# Patient Record
Sex: MALE | Race: WHITE
[De-identification: names, ages, dates, MRNs, and addresses within clinical notes are randomized per-mention and may not be internally consistent; named-entity substitution may affect disease eponyms.]

---

## 2017-08-11 ENCOUNTER — HOSPITAL ENCOUNTER (EMERGENCY)
Dept: HOSPITAL 25 - ED | Age: 52
Discharge: HOME | End: 2017-08-11
Payer: MEDICARE

## 2017-08-11 VITALS — DIASTOLIC BLOOD PRESSURE: 68 MMHG | SYSTOLIC BLOOD PRESSURE: 107 MMHG

## 2017-08-11 DIAGNOSIS — R07.9: Primary | ICD-10-CM

## 2017-08-11 DIAGNOSIS — F17.210: ICD-10-CM

## 2017-08-11 LAB
ALBUMIN SERPL BCG-MCNC: 4.3 G/DL (ref 3.2–5.2)
ALP SERPL-CCNC: 90 U/L (ref 34–104)
ALT SERPL W P-5'-P-CCNC: 37 U/L (ref 7–52)
ANION GAP SERPL CALC-SCNC: 4 MMOL/L (ref 2–11)
AST SERPL-CCNC: 29 U/L (ref 13–39)
BUN SERPL-MCNC: 8 MG/DL (ref 6–24)
BUN/CREAT SERPL: 9.1 (ref 8–20)
CALCIUM SERPL-MCNC: 9.4 MG/DL (ref 8.6–10.3)
CHLORIDE SERPL-SCNC: 104 MMOL/L (ref 101–111)
GLOBULIN SER CALC-MCNC: 2.8 G/DL (ref 2–4)
GLUCOSE SERPL-MCNC: 98 MG/DL (ref 70–100)
HCO3 SERPL-SCNC: 29 MMOL/L (ref 22–32)
HCT VFR BLD AUTO: 53 % (ref 42–52)
HGB BLD-MCNC: 17.9 G/DL (ref 14–18)
MCH RBC QN AUTO: 33 PG (ref 27–31)
MCHC RBC AUTO-ENTMCNC: 34 G/DL (ref 31–36)
MCV RBC AUTO: 97 FL (ref 80–94)
POTASSIUM SERPL-SCNC: 4.1 MMOL/L (ref 3.5–5)
PROT SERPL-MCNC: 7.1 G/DL (ref 6.4–8.9)
RBC # BLD AUTO: 5.47 10^6/UL (ref 4–5.4)
SODIUM SERPL-SCNC: 137 MMOL/L (ref 133–145)
TROPONIN I SERPL-MCNC: 0 NG/ML (ref ?–0.04)
WBC # BLD AUTO: 8.2 10^3/UL (ref 3.5–10.8)

## 2017-08-11 PROCEDURE — 80053 COMPREHEN METABOLIC PANEL: CPT

## 2017-08-11 PROCEDURE — 71010: CPT

## 2017-08-11 PROCEDURE — 99283 EMERGENCY DEPT VISIT LOW MDM: CPT

## 2017-08-11 PROCEDURE — 93005 ELECTROCARDIOGRAM TRACING: CPT

## 2017-08-11 PROCEDURE — 85379 FIBRIN DEGRADATION QUANT: CPT

## 2017-08-11 PROCEDURE — 36415 COLL VENOUS BLD VENIPUNCTURE: CPT

## 2017-08-11 PROCEDURE — 84484 ASSAY OF TROPONIN QUANT: CPT

## 2017-08-11 PROCEDURE — 83605 ASSAY OF LACTIC ACID: CPT

## 2017-08-11 PROCEDURE — 85025 COMPLETE CBC W/AUTO DIFF WBC: CPT

## 2017-08-11 NOTE — ED
Guillermo CLARKE Rebecca, scribed for Linsey Hutchinson MD on 08/11/17 at 1211 .





HPI Chest Pain





- HPI Summary


HPI Summary: 


Pt is a 50 y/o M who presents to ED c/o left anterior CP without radiation. 

Pain began a few weeks ago and has been constant since onset, waxing and waning 

in intensity. At its best, pain is 1/10 and at its worst, pain is 3-4/10. Pain 

is currently mild, ranked 3/10 and characterized as "heart pain." Pain is 

unchanged from wheat he has been experiencing since onset. Took 81 mg ASA PTA. 

Sx aggravated and alleviated by nothing, unchanged by deep breaths. Denies leg 

cramping. No recent travel. PMHx DVT, MI - current sx are not similar to pain 

experienced during MI. FHx MI < 54 y/o (mother). SHx heavy every day smoker. 








- History of Current Complaint


Chief Complaint: EDChestPainROMI


Hx Obtained From: Patient


Onset/Duration: Started Weeks Ago, Still Present


Timing: Constant


Initial Severity: Mild - 1/10


Current Severity: Mild


Pain Intensity: 3


Pain Scale Used: 0-10 Numeric


Chest Pain Location: Left Anterior


Chest Pain Radiates: No


Character: Other: - "heart pain"


Aggravating Factor(s): Nothing


Alleviating Factor(s): Nothing


Associated Signs and Symptoms: Positive: Negative





- Additional Pertinent History


Primary Care Physician: [id number]





- Allergy/Home Medications


Allergies/Adverse Reactions: 


 Allergies











Allergy/AdvReac Type Severity Reaction Status Date / Time


 


Penicillins Allergy Severe Hives Verified 08/11/17 12:03


 


Sulfa Antibiotics Allergy Severe Hives Verified 08/11/17 12:03











Home Medications: 


 Home Medications





Oxycodone HCl [Oxycontin] 15 mg PO QID PRN 08/11/17 [History Confirmed 08/11/17]


Pravastatin (NF) [Pravachol (NF)] 80 mg PO DAILY 08/11/17 [History Confirmed 08/ 11/17]











PMH/Surg Hx/FS Hx/Imm Hx


Endocrine/Hematology History: 


   Denies: Hx Diabetes


Cardiovascular History: Reports: Hx Angina, Hx Coronary Artery Disease - STENT, 

Hx Deep Vein Thrombosis - LEG 2006, Hx Hypercholesterolemia, Hx Hypertension, 

Hx Myocardial Infarction, Other Cardiovascular Problems/Disorders - BLOOD CLOT 

IN RIGHT LOWER LEG AFTER KNEE SURGERY


   Denies: Hx Congestive Heart Failure, Hx Pacemaker/ICD


GI History: Reports: Hx Gastroesophageal Reflux Disease - OCCASSIOANL -TAKES 2 

TUMS WITH RELIEF, Other GI Disorders - HEMMORHOIDS


Musculoskeletal History: Reports: Hx Arthritis, Other Musculoskeletal History - 

CHRONIC CERVICAL DISK DISEASE


Sensory History: 


   Denies: Hx Contacts or Glasses, Hx Hearing Aid


Opthamlomology History: 


   Denies: Hx Contacts or Glasses


Psychiatric History: 


   Denies: Hx Panic Disorder





- Surgical History


Surgery Procedure, Year, and Place: CARDIAC STENTS 2007 "MULTILINK VISION", 

CERVICAL LAMINECTOMY 2004 AND 2014, HEMMORRHOIDS, KNEE 2006


Hx Anesthesia Reactions: No


Infectious Disease History: No


Infectious Disease History: 


   Denies: Traveled Outside the [place] in Last 30 Days





- Family History


Known Family History: Positive: Cardiac Disease - Mother MI < 54 y/o, 

Hypertension





- Social History


Lives: With Family - Girlfriend


Alcohol Use: Occasionally


Substance Use Type: Reports: None


Hx Tobacco Use: Yes


Smoking Status (MU): Heavy Every Day Tobacco Smoker


Type: Cigarettes


Amount Used/How Often: PT STATES 1PPD, SO STATES 2PPD


Have You Smoked in the Last Year: Yes





Review of Systems


Positive: Chest Pain - Left anterior for a few weeks


Positive: Other - Negative leg cramping


All Other Systems Reviewed And Are Negative: Yes





Physical Exam





- Summary


Physical Exam Summary: 


General: Well appearing, mild discomfort


Skin: Warm, Skin Color Reflects Adequate Perfusion, Dry


Eyes: EOMI, COLE


ENT: Pharynx normal, TMs normal


Neck: Supple, nontender


Respiratory: CTA, breath sounds present, no rhonchi, no wheezes, no rales


Cardiovascular: RRR, no murmur, no rub, no gallop


Abdomen: Soft, nontender, Non-distended, no guarding, no rebound


Bowel: Present


Musculoskeletal: LUIS FERNANDO, No edema


Neuro: Sensory/motor intact, A&Ox3, CN intact 2-12


Psych: Affect/mood appropriate


Triage Information Reviewed: Yes


Vital Signs On Initial Exam: 


 Initial Vitals











Temp Pulse Resp BP Pulse Ox


 


 98.3 F   64   20   143/78   99 


 


 08/11/17 11:49  08/11/17 11:49  08/11/17 11:49  08/11/17 11:49  08/11/17 11:49











Vital Signs Reviewed: Yes





Diagnostics





- Vital Signs


 Vital Signs











  Temp Pulse Resp BP Pulse Ox


 


 08/11/17 11:59  99 F  67  16  129/81  96


 


 08/11/17 11:49  98.3 F  64  20  143/78  99














- Laboratory


Lab Results: 


 Lab Results











  08/11/17 08/11/17 08/11/17 Range/Units





  12:15 12:15 12:15 


 


WBC  8.2    (3.5-10.8)  10^3/ul


 


RBC  5.47 H    (4.0-5.4)  10^6/ul


 


Hgb  17.9    (14.0-18.0)  g/dl


 


Hct  53 H    (42-52)  %


 


MCV  97 H    (80-94)  fL


 


MCH  33 H    (27-31)  pg


 


MCHC  34    (31-36)  g/dl


 


RDW  15    (10.5-15)  %


 


Plt Count  205    (150-450)  10^3/ul


 


MPV  8    (7.4-10.4)  um3


 


Neut % (Auto)  73.5    (38-83)  %


 


Lymph % (Auto)  17.3 L    (25-47)  %


 


Mono % (Auto)  6.5    (1-9)  %


 


Eos % (Auto)  2.2    (0-6)  %


 


Baso % (Auto)  0.5    (0-2)  %


 


Absolute Neuts (auto)  6.0    (1.5-7.7)  10^3/ul


 


Absolute Lymphs (auto)  1.4    (1.0-4.8)  10^3/ul


 


Absolute Monos (auto)  0.5    (0-0.8)  10^3/ul


 


Absolute Eos (auto)  0.2    (0-0.6)  10^3/ul


 


Absolute Basos (auto)  0    (0-0.2)  10^3/ul


 


Absolute Nucleated RBC  0    10^3/ul


 


Nucleated RBC %  0    


 


D-Dimer, Quantitative   < 200   (Less Than 230)  ng/mL


 


Sodium    137  (133-145)  mmol/L


 


Potassium    4.1  (3.5-5.0)  mmol/L


 


Chloride    104  (101-111)  mmol/L


 


Carbon Dioxide    29  (22-32)  mmol/L


 


Anion Gap    4  (2-11)  mmol/L


 


BUN    8  (6-24)  mg/dL


 


Creatinine    0.88  (0.67-1.17)  mg/dL


 


Est GFR ( Amer)    116.5  (>60)  


 


Est GFR (Non-Af Amer)    90.6  (>60)  


 


BUN/Creatinine Ratio    9.1  (8-20)  


 


Glucose    98  ()  mg/dL


 


Lactic Acid     (0.5-2.0)  mmol/L


 


Calcium    9.4  (8.6-10.3)  mg/dL


 


Total Bilirubin    0.40  (0.2-1.0)  mg/dL


 


AST    29  (13-39)  U/L


 


ALT    37  (7-52)  U/L


 


Alkaline Phosphatase    90  ()  U/L


 


Troponin I    0.00  (<0.04)  ng/mL


 


Total Protein    7.1  (6.4-8.9)  g/dL


 


Albumin    4.3  (3.2-5.2)  g/dL


 


Globulin    2.8  (2-4)  g/dL


 


Albumin/Globulin Ratio    1.5  (1-3)  














  08/11/17 Range/Units





  12:15 


 


WBC   (3.5-10.8)  10^3/ul


 


RBC   (4.0-5.4)  10^6/ul


 


Hgb   (14.0-18.0)  g/dl


 


Hct   (42-52)  %


 


MCV   (80-94)  fL


 


MCH   (27-31)  pg


 


MCHC   (31-36)  g/dl


 


RDW   (10.5-15)  %


 


Plt Count   (150-450)  10^3/ul


 


MPV   (7.4-10.4)  um3


 


Neut % (Auto)   (38-83)  %


 


Lymph % (Auto)   (25-47)  %


 


Mono % (Auto)   (1-9)  %


 


Eos % (Auto)   (0-6)  %


 


Baso % (Auto)   (0-2)  %


 


Absolute Neuts (auto)   (1.5-7.7)  10^3/ul


 


Absolute Lymphs (auto)   (1.0-4.8)  10^3/ul


 


Absolute Monos (auto)   (0-0.8)  10^3/ul


 


Absolute Eos (auto)   (0-0.6)  10^3/ul


 


Absolute Basos (auto)   (0-0.2)  10^3/ul


 


Absolute Nucleated RBC   10^3/ul


 


Nucleated RBC %   


 


D-Dimer, Quantitative   (Less Than 230)  ng/mL


 


Sodium   (133-145)  mmol/L


 


Potassium   (3.5-5.0)  mmol/L


 


Chloride   (101-111)  mmol/L


 


Carbon Dioxide   (22-32)  mmol/L


 


Anion Gap   (2-11)  mmol/L


 


BUN   (6-24)  mg/dL


 


Creatinine   (0.67-1.17)  mg/dL


 


Est GFR ( Amer)   (>60)  


 


Est GFR (Non-Af Amer)   (>60)  


 


BUN/Creatinine Ratio   (8-20)  


 


Glucose   ()  mg/dL


 


Lactic Acid  1.1  (0.5-2.0)  mmol/L


 


Calcium   (8.6-10.3)  mg/dL


 


Total Bilirubin   (0.2-1.0)  mg/dL


 


AST   (13-39)  U/L


 


ALT   (7-52)  U/L


 


Alkaline Phosphatase   ()  U/L


 


Troponin I   (<0.04)  ng/mL


 


Total Protein   (6.4-8.9)  g/dL


 


Albumin   (3.2-5.2)  g/dL


 


Globulin   (2-4)  g/dL


 


Albumin/Globulin Ratio   (1-3)  











Result Diagrams: 


 08/11/17 12:15





 08/11/17 12:15


Lab Statement: Any lab studies that have been ordered have been reviewed, and 

results considered in the medical decision making process.





- Radiology


  ** CXR


Radiology Interpretation Completed By: ED Physician - Normal - unchanged from 8/ 26/2016





- EKG


  ** 1200


Cardiac Rate: NL - 62 bpm


EKG Rhythm: Sinus Rhythm


EKG Interpretation: No ST elevations, no Q waves


EKG Comparison: No Significant Change - No change from EKG on 8/22/2016





Re-Evaluation





- Re-Evaluation


  ** First Eval


Re-Evaluation Time: 13:14


Comment: Discussed at length about being observed in the hospital because he 

cannot be told anything about the 6 hours before blood was drawn and whether 

this is angina that could lead to a heart attack. He expresses that he does not 

want to be observed and wants to go home. He and his son who accompanies him in 

the room understand the risk of going home, uncluding death. Denies any 

radiation of pain to the back. Understands signs of reflux.





Chest Pain Course/Dx





- Course


Assessment/Plan: Advised to f/u with PCP.  50 yo male with hx of both dvt and 

MI with stents with several days of constant chest pain that he wasn't going to 

come to ED about but [facility] to pain mgmt mentioned it and they sent him here. EKG'

s were unchanged and a trop was neg along with a ddimer.  With his son in the 

room I asked pt stay for an obv admission and pt refused, he understands that 

the troponin is limited by 6 hours and I can only tell him that he didn't have 

an MI before the early am today and I can not tell him that the pain he has 

been having isn't cp that could lead to an MI. He denies pain going to his back.





- Diagnoses


Provider Diagnoses: 


 Chest pain








Discharge





- Discharge Plan


Condition: Stable


Disposition: HOME


Patient Education Materials:  Chest Pain (ED)


Referrals: 


Gabriel Kate MD [Primary Care Provider] - 3 Days





The documentation as recorded by the Guillermo fraser Rebecca accurately 

reflects the service I personally performed and the decisions made by me, 

Linsey Hutchinson MD.

## 2017-08-11 NOTE — RAD
HISTORY: Chest pain



COMPARISONS: August 21, 2016



VIEWS:1: Single frontal portable view of the chest at 1:00 PM



FINDINGS:

LINES AND TUBES: None.

CARDIOMEDIASTINAL SILHOUETTE: The cardiomediastinal silhouette is normal for portable

technique.

PLEURA: The costophrenic angles are sharp. No pleural abnormalities are noted.

LUNG PARENCHYMA: There is hyperinflation.

ABDOMEN: The upper abdomen is clear. There is no subphrenic gas.

BONES AND SOFT TISSUES: No bone or soft tissue abnormalities are noted.



IMPRESSION: NO ACTIVE CARDIOPULMONARY DISEASE.

## 2019-02-07 NOTE — HP
CC:  Dr. Norman Ambriz

 

HISTORY AND PHYSICAL:

 

DATE OF PLANNED ADMISSION AND SURGERY:  02/13/19

 

HISTORY OF PRESENT ILLNESS:  Mr. Rosenberg is a 53-year-old white male, who is 
admitted for cystoscopy and excisional biopsy of a bladder lesion.

 

Mr. Rosenberg is a chronic heavy smoker of many years.  I have been following him 
for the last 9 years because of initially gross hematuria and then persistent 
microscopic hematuria.  He was worked up with periodic cystoscopies and CT 
urograms and the only abnormality noted was an element of meatal stenosis.

 

He was recently re-evaluated because of the persistent microscopic hematuria.  
He had a cystoscopy in the office, which showed a 1 cm elevated lesion in the 
anterior bladder wall.  The lesion looked suspicious, but was not typical for 
transitional cell carcinoma.

 

He then had a CT urogram, which was normal showing no renal masses and no 
abnormal filling defects in the collecting systems or the ureters.  His urine 
cytology was also negative.

 

Because of the history of chronic smoking and the lesion noted on cystoscopy, 
the patient is admitted for excisional biopsy.

 

PAST MEDICAL HISTORY AND SYSTEM REVIEW:  The patient has past history of an MI 
and had a stent placed almost 10 years ago.  He has not had any chest pain or 
cardiac symptoms.

 

He has history of deep vein thrombosis that occurred after surgery on his lower 
extremity several years ago.  It has not recurred.

 

He has history of hyperlipidemia, chronic back pain, and hypertension.  He is 
maintained on Norvasc 5 mg daily, baby aspirin and on a statin.

 

Because of his chronic back pain, he is on oxycodone 15 mg 3 times per day and 
on Oxyfast as needed.

 

He reports being allergic to PENICILLIN, which gives him hives.  He has not had 
any chest pain or any changes in his exercise tolerance.

 

                               PHYSICAL EXAMINATION

 

GENERAL:  He is a pleasant, white male, who looks older than his age.

 

VITAL SIGNS:  Blood pressure 130/80, pulse of 80, oxygen saturation 96% on room 
air.

 

LUNGS:  Clear.  He has occasional wheezing.

 

HEART:  Regular and rhythmic.  No murmurs.

 

ABDOMEN:  Soft.  No masses.  No tenderness and no CVA tenderness.

 

EXTERNAL GENITALIA:  Normal.

 

RECTAL:  KRYSTINA had previously shown a nonenlarged prostate that is firm, but 
smooth.

 

 IMPRESSION:  Chronic smoker with chronic microscopic hematuria and suspicious 
lesion in the dome of the bladder on cystoscopy, and a normal CT urogram, for 
cystoscopy and excisional biopsy.

 

I discussed the operative procedure in detail with the patient.  All his 
questions were answered.

 

 

 

[id number]/CPS #: [id number]

BETHANIE

## 2019-02-13 ENCOUNTER — HOSPITAL ENCOUNTER (OUTPATIENT)
Dept: HOSPITAL 25 - OR | Age: 54
Discharge: HOME | End: 2019-02-13
Attending: UROLOGY
Payer: MEDICARE

## 2019-02-13 VITALS — DIASTOLIC BLOOD PRESSURE: 76 MMHG | SYSTOLIC BLOOD PRESSURE: 114 MMHG

## 2019-02-13 DIAGNOSIS — I25.2: ICD-10-CM

## 2019-02-13 DIAGNOSIS — K21.9: ICD-10-CM

## 2019-02-13 DIAGNOSIS — Z85.820: ICD-10-CM

## 2019-02-13 DIAGNOSIS — I47.2: ICD-10-CM

## 2019-02-13 DIAGNOSIS — Z72.0: ICD-10-CM

## 2019-02-13 DIAGNOSIS — E78.5: ICD-10-CM

## 2019-02-13 DIAGNOSIS — I25.10: ICD-10-CM

## 2019-02-13 DIAGNOSIS — N35.914: Primary | ICD-10-CM

## 2019-02-13 DIAGNOSIS — R31.29: ICD-10-CM

## 2019-02-13 DIAGNOSIS — Z95.5: ICD-10-CM

## 2019-02-13 NOTE — OP
CC:  Dr. Ambriz

 

OPERATIVE REPORT:

 

DATE OF OPERATION:  02/13/19

 

DATE OF BIRTH:  [birth year]

 

SURGEON:  Dr. Kate.

 

ANESTHESIOLOGIST:  Dr. Dion Bird.

 

ANESTHESIA:  General.

 

PRE-OP DIAGNOSIS:  

1. Bladder lesion, anterior bladder wall.

 

POST-OP DIAGNOSES:

1.  Distal ureteral stricture.

2.  Negative cystoscopy.

 

OPERATIVE PROCEDURES:

1.  Urethral dilation.

2.  Cystoscopy.

 

INDICATION FOR PROCEDURE:  Mr. Rosenberg is a 53-year-old white male, who is a 
chronic heavy smoker with chronic microscopic hematuria, who has been worked up 
in the past and no pathology was noted.  Because of persistent microhematuria, 
he had a repeat work-up. CT urogram was normal, urine cytology was negative, 
and office flexible cystoscopy showed a 1 cm slightly elevated lesion in the 
anterior bladder wall that was not typical for transitional cell carcinoma, but 
was suspicious enough to recommend a biopsy.

 

Because of that finding, the patient was admitted for cystoscopy and excisional 
biopsy.

 

PATHOLOGY: At cystoscopy, there was a tight stricture in the distal urethra at 
the level of the fossa navicularis.  It calibrated to less than 14-Norwegian.  
Following dilation of the stricture, the rest of the urethra looked normal, and 
no other strictures seen.  The prostatic urethra was short and open.  
Examination of the bladder showed normal ureteral orifices.  The bladder wall 
looked normal.  The lesion that was seen in the office on flexible cystoscopy 
was not visualized on rigid cystoscopy.

To make sure that the lesion was not located too anterior and proximal in the 
bladder wall, a flexible cystoscopy was also performed.  The whole anterior 
wall including the area just proximal to the anterior bladder neck were 
carefully inspected and the lesion noted in the office was not visualized.  It 
must have been an inflammatory lesion that resolved spontaneously.

 

DESCRIPTION OF PROCEDURE:  After successful general anesthesia, patient was 
placed in the lithotomy position and was prepped and draped for a cystoscopy.  
The cystoscope could not be introduced inside the urethra.  The distal urethra 
was then dilated sequentially up to 22-Norwegian, allowing the introduction of the 
cystoscope. The cystoscope was then introduced all the way inside the bladder.  
The bladder was carefully inspected and no pathology was noted.  The rigid 
cystoscope was then removed and the flexible cystoscope was introduced under 
direct vision inside the bladder.  Bladder was inspected, in particular the 
anterior wall, and again, no pathology was noted.

 

The cystoscope was removed.  The bladder was emptied with the semirigid 
cystoscope.

 

Patient tolerated the procedure well and left the operating room in good 
condition.

The plan is to instruct the patient to do self dilations of the distal urethra 
with 16 Fr straight catheter to prevent the stricture from recurring. 

 

 [id number]/CPS #: [id number]

MTDD

## 2020-02-17 ENCOUNTER — HOSPITAL ENCOUNTER (EMERGENCY)
Dept: HOSPITAL 25 - ED | Age: 55
Discharge: LEFT BEFORE DISCHARGE | End: 2020-02-17
Payer: MEDICARE

## 2020-02-17 VITALS — SYSTOLIC BLOOD PRESSURE: 118 MMHG | DIASTOLIC BLOOD PRESSURE: 77 MMHG

## 2020-02-17 DIAGNOSIS — Z88.0: ICD-10-CM

## 2020-02-17 DIAGNOSIS — I25.2: ICD-10-CM

## 2020-02-17 DIAGNOSIS — F17.210: ICD-10-CM

## 2020-02-17 DIAGNOSIS — R07.9: Primary | ICD-10-CM

## 2020-02-17 DIAGNOSIS — K21.9: ICD-10-CM

## 2020-02-17 DIAGNOSIS — I25.10: ICD-10-CM

## 2020-02-17 DIAGNOSIS — Z95.5: ICD-10-CM

## 2020-02-17 DIAGNOSIS — Z86.718: ICD-10-CM

## 2020-02-17 DIAGNOSIS — Z88.2: ICD-10-CM

## 2020-02-17 LAB
ALBUMIN SERPL BCG-MCNC: 4.5 G/DL (ref 3.2–5.2)
ALBUMIN/GLOB SERPL: 1.6 {RATIO} (ref 1–3)
ALP SERPL-CCNC: 96 U/L (ref 34–104)
ALT SERPL W P-5'-P-CCNC: 39 U/L (ref 7–52)
ANION GAP SERPL CALC-SCNC: 7 MMOL/L (ref 2–11)
AST SERPL-CCNC: 26 U/L (ref 13–39)
BASOPHILS # BLD AUTO: 0 10^3/UL (ref 0–0.2)
BUN SERPL-MCNC: 10 MG/DL (ref 6–24)
BUN/CREAT SERPL: 9.6 (ref 8–20)
CALCIUM SERPL-MCNC: 9.2 MG/DL (ref 8.6–10.3)
CHLORIDE SERPL-SCNC: 102 MMOL/L (ref 101–111)
EOSINOPHIL # BLD AUTO: 0.1 10^3/UL (ref 0–0.6)
GLOBULIN SER CALC-MCNC: 2.8 G/DL (ref 2–4)
GLUCOSE SERPL-MCNC: 125 MG/DL (ref 70–100)
HCO3 SERPL-SCNC: 29 MMOL/L (ref 22–32)
HCT VFR BLD AUTO: 50 % (ref 42–52)
HGB BLD-MCNC: 17.3 G/DL (ref 14–18)
INR PPP/BLD: 0.99 (ref 0.82–1.09)
LYMPHOCYTES # BLD AUTO: 1.5 10^3/UL (ref 1–4.8)
MCH RBC QN AUTO: 32 PG (ref 27–31)
MCHC RBC AUTO-ENTMCNC: 34 G/DL (ref 31–36)
MCV RBC AUTO: 94 FL (ref 80–94)
MONOCYTES # BLD AUTO: 0.5 10^3/UL (ref 0–0.8)
NEUTROPHILS # BLD AUTO: 9.5 10^3/UL (ref 1.5–7.7)
NRBC # BLD AUTO: 0 10^3/UL
NRBC BLD QL AUTO: 0
PLATELET # BLD AUTO: 254 10^3/UL (ref 150–450)
POTASSIUM SERPL-SCNC: 4.4 MMOL/L (ref 3.5–5)
PROT SERPL-MCNC: 7.3 G/DL (ref 6.4–8.9)
RBC # BLD AUTO: 5.37 10^6 /UL (ref 4.18–5.48)
SODIUM SERPL-SCNC: 138 MMOL/L (ref 135–145)
TROPONIN I SERPL-MCNC: 0 NG/ML (ref ?–0.03)
WBC # BLD AUTO: 11.7 10^3/UL (ref 3.5–10.8)

## 2020-02-17 PROCEDURE — 36415 COLL VENOUS BLD VENIPUNCTURE: CPT

## 2020-02-17 PROCEDURE — 93005 ELECTROCARDIOGRAM TRACING: CPT

## 2020-02-17 PROCEDURE — 85025 COMPLETE CBC W/AUTO DIFF WBC: CPT

## 2020-02-17 PROCEDURE — 80053 COMPREHEN METABOLIC PANEL: CPT

## 2020-02-17 PROCEDURE — 85610 PROTHROMBIN TIME: CPT

## 2020-02-17 PROCEDURE — 99282 EMERGENCY DEPT VISIT SF MDM: CPT

## 2020-02-17 PROCEDURE — 84484 ASSAY OF TROPONIN QUANT: CPT

## 2020-02-17 PROCEDURE — 71046 X-RAY EXAM CHEST 2 VIEWS: CPT

## 2020-02-17 NOTE — XMS REPORT
Continuity of Care Document (CCD)

 Created on:2020



Patient:Anupam Rosenberg

Sex:Male

:1965

External Reference #:[id number]





Demographics







 Address  [address] [place] [id number]

 

 Home Phone  [phone]

 

 Preferred Language  en

 

 Marital Status  Not  or 

 

 Muslim Affiliation  Unknown

 

 Race  White

 

 Ethnic Group  Not  or 









Author







 Name  Dominguez France DO, MPH

 

 Address  [address] [place] [address], [address]



   Unavailable



   [place] [address]









Care Team Providers







 Name  Role  Phone

 

 Alejandro Bellamy MD - Neurological  Care Team Information   [phone]
[phone]



 Surgery    









Problems







 Description

 

 No Information Available







Social History







 Type  Date  Description  Comments

 

 Birth Sex    Unknown  

 

 Cigarette Use    Current Cigarette Smoker  1 Pack Daily  

 

 ETOH Use    Rarely consumes beer  

 

 Tobacco Use  Start: Unknown  Patient is a current smoker, smokes every  



     day  

 

 Smoking Status  Reviewed: 20  Patient is a current smoker, smokes every  



     day  







Allergies, Adverse Reactions, Alerts







 Active Allergies  Reaction  Severity  Comments  Date

 

 PCN        2005

 

 Amoxicillin        2005

 

 Sulfa Antibiotics  hives      2013







Medications







 Active Medications  SIG  Qnty  Indications  Ordering  Date



         Provider  

 

 Lidoderm  si applied to  30units    Dominguez France,  08/15/2016



          5% Patches  affected area      DO, MPH  



   daily, not more        



   than 10h as        



   directed chronic        



   pain patient        

 

 Oxycodone HCL  si by mouth  120tabs    Dominguez France,  2016



               15mg  every 6 to 8 hours      DO, MPH  



 Tablets  as directed chronic        



   pain patient        

 

 Oxycodone HCL  si/4-1/2  30ml    Dominguez France,  2006



   milliliters sl by      DO, MPH  



 100mg/5ML  mouth every 6 to 8        



 Concentrate  hours as directed        



   chronic pain        



   patient        

 

 Pravastatin Sodium  1 PO QHS      Unknown  



           



 40mg Tablets          



           

 

 Atenolol        Unknown  



          25mg          



 Tablets          



           

 

 Aspir-81  1 by mouth every      Unknown  



          81mg  night at bedtime        



 Tablets DR          



           

 

 Amlodipine Besylate        Unknown  



           



 5mg Tablets          



           

 

 Multi For Him        Unknown  



           



 Capsules          



           









 History Medications









 Prednisone  si/2 to 1 by  30tabs    Dominguez France,  12/10/2019 -



          20mg Tablets  mouth three times a      DO, MPH  2020



   day as directed        







Immunizations







 Description

 

 No Information Available







Vital Signs







 Date  Vital  Result  Comment

 

 2020 11:15am  BP Systolic  128 mmHg  









 BP Diastolic  80 mmHg  

 

 Heart Rate  74 /min  

 

 Respiratory Rate  20 /min  

 

 Height  67 inches  5'7"

 

 Weight  160.00 lb  

 

 Pain Level  6  Pain at this time.

 

 Pain Level With Medicine  5  on average with meds

 

 Pain Level Without Medicine  9  without meds

 

 BMI (Body Mass Index)  25.1 kg/m2  









 2020 11:31am  BP Systolic  142 mmHg  









 BP Diastolic  86 mmHg  

 

 Heart Rate  84 /min  

 

 Respiratory Rate  20 /min  

 

 Height  67 inches  5'7"

 

 Weight  161.00 lb  

 

 Pain Level  8  Pain at this time.

 

 Pain Level With Medicine  7  on average with meds

 

 Pain Level Without Medicine  9  without meds

 

 BMI (Body Mass Index)  25.2 kg/m2  







Results







 Description

 

 No Information Available







Procedures







 Description

 

 No Information Available







Medical Devices







 Description

 

 No Information Available







Encounters







 Type  Date  Location  Provider  Dx  Diagnosis

 

 Office Visit  2020  Main Office as  Dominguez France  G89.28  Other chronic



   11:45a  Of 14  DO, MPH    postprocedural pain









 M54.2  Cervicalgia

 

 Z79.891  Long term (current) use of opiate analgesic

 

 Z79.891  Long term (current) use of opiate analgesic









 Office Visit  12/10/2019 11:30a  Main Office  Dominguez France  G89.28  Other 
chronic



     as Of 14  DO, MPH    postprocedural pain









 M54.2  Cervicalgia

 

 Z79.891  Long term (current) use of opiate analgesic

 

 Z79.891  Long term (current) use of opiate analgesic









 Office Visit  2019 11:30a  Main Office  Dominguez France  G89.28  Other 
chronic



     as Of 14  DO, MPH    postprocedural pain









 M54.2  Cervicalgia

 

 Z79.891  Long term (current) use of opiate analgesic

 

 Z79.891  Long term (current) use of opiate analgesic









 Office Visit  2019 11:30a  Main Office  Dominguez France  G89.28  Other 
chronic



     as Of 14  DO, MPH    postprocedural pain









 M54.2  Cervicalgia

 

 Z79.891  Long term (current) use of opiate analgesic

 

 Z79.891  Long term (current) use of opiate analgesic









 Office Visit  2019 11:30a  Main Office  France, Dominguez,  G89.28  Other 
chronic



     as Of 14  , MPH    postprocedural pain









 M54.2  Cervicalgia

 

 Z79.891  Long term (current) use of opiate analgesic

 

 Z79.891  Long term (current) use of opiate analgesic







Assessments







 Date  Code  Description  Provider

 

 2020  G89.28  Other chronic postprocedural pain  France, Dominguez, DO, MPH

 

 2020  M54.2  Cervicalgia  France, Dominguez, DO, MPH

 

 2020  Z79.891  Long term (current) use of opiate analgesic  France, Dominguez
, DO, MPH

 

 2020  Z79.891  Long term (current) use of opiate analgesic  France, Dominguez
, DO, MPH

 

 2020  G89.28  Other chronic postprocedural pain  France, Dominguez, DO, MPH

 

 2020  M54.2  Cervicalgia  France, Dominguez, DO, MPH

 

 2020  Z79.891  Long term (current) use of opiate analgesic  France, Dominguez
, DO, MPH

 

 2020  Z79.891  Long term (current) use of opiate analgesic  France, Dominguez
, DO, MPH

 

 12/10/2019  G89.28  Other chronic postprocedural pain  France, Dominguez, DO, MPH

 

 12/10/2019  M54.2  Cervicalgia  France, Dominguez, DO, MPH

 

 12/10/2019  Z79.891  Long term (current) use of opiate analgesic  France, Dominguez
, DO, MPH

 

 12/10/2019  Z79.891  Long term (current) use of opiate analgesic  France, Dominguez
, DO, MPH

 

 2019  G89.28  Other chronic postprocedural pain  France, Dominguez, DO, MPH

 

 2019  M54.2  Cervicalgia  France, Dominguez, DO, MPH

 

 2019  Z79.891  Long term (current) use of opiate analgesic  France, Dominguez
, DO, MPH

 

 2019  Z79.891  Long term (current) use of opiate analgesic  Dominguez France DO MPH

 

 2019  G89.28  Other chronic postprocedural pain  Dominguez France DO MPH

 

 2019  M54.2  Cervicalgia  Dominguez France DO, MPH

 

 2019  Z79.891  Long term (current) use of opiate analgesic  Dominguez France DO, MPH

 

 2019  Z79.891  Long term (current) use of opiate analgesic  Dominguez France DO, MPH

 

 2019  G89.28  Other chronic postprocedural pain  Dominguez France DO, MPH

 

 2019  M54.2  Cervicalgia  Dominguez France DO MPH

 

 2019  Z79.891  Long term (current) use of opiate analgesic  Dominguez France DO, MPH

 

 2019  Z79.891  Long term (current) use of opiate analgesic  Dominguez France DO, MPH







Plan of Treatment

Future Appointment(s):2020 11:30 am - Dominguez France DO MPH at Main 
Office as Of  - Dominguez France DO, MPHG89.28 Other chronic 
postprocedural painComments:Chronic. Symptoms and complaints discussed and 
reviewed today. No significant changes in physical findings.  Continue current 
medical pain management.M54.2 CervicalgiaComments:Chronic. Symptoms and 
complaints discussed and reviewed today. No significant changes in physical 
findings.  Continue current medical pain management.Z79.891 Long term (current) 
use of opiate analgesicNew Labs:Urine Drug Screen, Ordered: 20Comments:
Urine drug screen sample taken.  Rapid Point of Care Cup was reviewed in office 
with patient.  Will send out UDT Rapid to [facility] lab for confirmation 
testing.  Urine Drug Testing (UDT) was done today to monitor opiate use and to 
monitor possible use of illicit substances. I will discuss the results at the 
next appointment from the [facility] lab.The following tests were ordered:6 AM
, AMPH, ZELDA, GREGOR, BUP, CARIS, COCM, ETG, FENT, MCSHSG,  OPI, OXY, PCP, TAPEN,
  XTSY, ZOLP.      A urine drug test (UDT) using a rapid screen cup was ordered 
for this patient and collected on site today.  Creatinine has been ordered as 
well for specimen validity, not for kidney function.  Urine Drug Testing is a 
mandatory component of chronic opioid management, as part of the baseline 
assessment and ongoing re-assessment of opioid therapy. Per [facility] 
Workers[employer] Compensation Board, [place] Non-Acute Pain Medical Treatment 
Guidelines, section F.3.d.i. This test is to be used in conjunction with other 
clinical information when decisions are to be made to continue, adjust or 
discontinue treatment. This information includes clinical observation, results 
of addiction screening, pill counts, and prescription drug monitoring reports.  
Preliminary UDT screen results are not final and should not be used to 
determine patient care or plan of treatment.  This sample will be sent out for 
a more comprehensive quantitative confirmation LCMS study.  It is part of the 
treatment process of prescribing controlled substances and is considered 
standard of care at this clinic.AllComments:Continue current medical pain 
management;  injection therapy, osteopathic manipulation, PT / modalities, and 
consults as needed to manage chronic pain.Non - opioid pain management 
discussed and optionsdiscussed.Side effects discussed; anticipatory guidance 
given. Patient clearly understand and agree with all medical treatments and 
suggestions. All medicines prescribed are adequate and appropriate for this 
patient's complaint of pain, medical history, physical, and personal 
goals.Goals of Treatment are to provide adequate and appropriate 
multidisciplinary medical pain management to increase/ maintain patient's 
quality of life and functionality while maintaining satisfactory side effect 
profile andminimizing long term end-organ damage. Importance of regular 
nutrition throughout the day discussed.Activity as toleratedContinue with PCP



Functional Status







 Description

 

 No Information Available







Mental Status







 Description

 

 No Information Available







Referrals







 Description

 

 No Information Available

## 2020-02-17 NOTE — XMS REPORT
Summary of Care

 Created on:2020



Patient:Anupam Rosenberg

Sex:Male

:1965

External Reference #:[id number]





Demographics







 Address  [address] [place] [address]

 

 Home Phone  [phone]

 

 Work Phone  [phone]

 

 Mobile Phone  [phone]

 

 Preferred Language  English

 

 Marital Status  Not  or 

 

 Ep[employer] Affiliation  Unknown

 

 Race  White

 

 Ethnic Group  Not  or 









Author







 Organization  [facility]

 

 Address  [address]



   [place] [address]









Support







 Name  Relationship  Address  Phone

 

 Oli Rosenberg  Unavailable  [address]  [phone]



     [place] [address]  

 

 Oli Head  Unavailable  [address]  [phone]



     [place] [address]  

 

 Kishan Head  Unavailable  [address]  [phone]



     [place] [address]  









Care Team Providers







 Name  Role  Phone

 

 Joey Ambriz  Primary Care Provider  [phone]









Reason for Referral

Refer to Department Only (Routine)





 Status  Reason  Specialty  Diagnoses /  Referred By  Referred To



       Procedures  Contact  Contact

 

 Pending Review    Gastroenterology  Diagnoses



Routine general medical examination at a health care facility  Joey Ambriz MD



  Gastroenterolo



         [address] [facility]



  gy/Hepatology







         [place]  [address] [place], [place] [address]



  Road







         Phone:  [place], [place]



         [phone]







         Fax:  Phone:



         [phone]







           Fax:



           [phone]









 Scheduling Instructions

 

 /70  | Pulse 98  | Ht 5' 7" (1.702 m)  | Wt 165 lb (74.8 kg)  | BMI 
25.84 kg/m





  







 BMI Readings from Last 4 Encounters:







 20 : 25.84 kg/m





 







 19 : 24.28 kg/m





 







 18 : 23.96 kg/m





 







 17 : 24.90 kg/m





 







 







 







 Controlled Substance Medications: oxyCODONE HCl ER







 







 Anticoagulant Medications:  







 







 Psychiatric/Antianxiety Medications:  







 







 Antiretroviral Medications:  







 







 







 







 







 







 







 







 













Reason for Visit







 Reason  Comments

 

 Hematuria  Patient wants his urine tested for blood and cancer. He states he 
will not



   go back to urology because it costs to much money.

 

 Earache  Left ear pain.







Encounter Details







 Date  Type  Department  Care Team  Description

 

 2020  Office Visit  [facility]  [place], Joey HAWKINS,  Coronary artery 
disease involving native coronary artery of native heart without angina 
pectoris (Primary Dx);



     Medicine



  MD



  Tobacco use disorder;



     [address]



  [address]



  Pre-diabetes;



     [place] [address]



  [place] [address]



  Asymptomatic microscopic hematuria;



     [phone]



  Essential hypertension;



       [phone] (Fax)  Routine general medical examination at a health care 
facility;



         History of urethral stricture;



         Hematuria, unspecified type;



         Acute serous otitis media of left ear without rupture







Allergies







 Active Allergy  Reactions  Severity  Noted Date  Comments

 

 Bactrim  Hives    2013  

 

 Doxycycline  Rash    2013  

 

 Penicillins  Hives    2007  "all cillins"

 

 Sulfa Antibiotics  Rash    2013  



documented as of this encounter (statuses as of 2020)



Medications







 Medication  Sig  Dispensed  Refills  Start  End Date  Status



         Date    

 

 Sildenafil Citrate  Take 0.5-1  6 Tab  5      Active



 (VIAGRA) 100 MG Oral  Tabs by      3    



 Tab  mouth DAILY          



   AS NEEDED (1          



   hour prior          



   to          



   intercourse)          



   .          

 

 Aspirin 81 MG Oral  Take 1 Tab  30 Tab  5  04/15/201    Active



 TabIndications:  by mouth      4    



 Coronary artery  DAILY.          



 disease            

 

 OxyCODONE HCl ER  Take 1 Tab    0      Active



 (OXYCONTIN) 15 MG Oral  by mouth          



 Tablet Extended  EVERY EIGHT          



 Release 12 hour  HOURS AS          



 Abuse-Deterrent  NEEDED.          

 

 amLodipine (NORVASC) 5  Take 1 Tab  90 Tab  5      Active



 MG Oral  by mouth      7    



 TabIndications:  DAILY.          



 Essential hypertension            

 

 Rosuvastatin Calcium  Take 1 Tab  90 Tab  3      Active



 (CRESTOR) 20 MG Oral  by mouth      9    



 Tab  DAILY.          

 

 methylPREDNISolone  Take 1 Tab  108 Tab  0      Active



 (MEDROL) 4 MG Oral Tab  by mouth AS      0    



   DIRECTED.          



   Take 8          



   tablets          



   daily.          



   Decrease by          



   1 tablet          



   every 3 days          



   till          



   finished.          

 

 buPROPion (WELLBUTRIN  Take 1 Tab  60 Tab  5  20  Discontinued



 SR) 150 MG Oral TABLET  by mouth AS      9  20  (Error)



 SR 12 HR  DIRECTED.          



   Take one          



   pill in AM          



   with food          



   for 7 days,          



   then 1 pill          



   twice daily          



   with food.          



documented as of this encounter (statuses as of 2020)



Active Problems







 Problem  Noted Date

 

 History of urethral stricture  2020

 

 History of melanoma in situ  2017

 

 Malignant melanoma in situ  2017









 Overview: 







 Excision left cheek Dr Silva dec 2016 and dermatology MD Dr Durham









 Essential hypertension  2017

 

 Mixed hyperlipidemia  2011









 Overview: 







 Total cholesterol [facility] 2012: 187, ldl 82, hdl 60 
triglyceride 223









 Tobacco use disorder  10/15/2009









 Overview: 



Quit 5 days 2014



 Began age 23



 5 cigarette per day









 Paroxysmal ventricular tachycardia  2008









 Overview: 







 Posterior MI  [facility] Dr Selina Farias [facility]









 Cervical spondylosis without myelopathy  2007









 Overview: 







 Cervical discectomy C5-6 2014 [facility] Dr Pop









 Chronic neck pain  2007









 Overview: 







 [facility] Dr France









 Coronary artery disease  2007









 Overview: 



S/p acute MI  complicated by ventricular tachycardia requiring intra-aortic 
balloon pump.



 S/p 3 drug eluting stents [facility] 



 Dr Selina Farias [facility]



 Stress test Dr Farias 11/10: normal



documented as of this encounter (statuses as of 2020)



Resolved Problems







 Problem  Noted Date  Resolved Date

 

 Disturbance of skin sensation  2007



documented as of this encounter (statuses as of 2020)



Social History







 Tobacco Use  Types  Packs/Day  Years Used  Date

 

 Current Every Day Smoker        









 Smokeless Tobacco: Never Used      









 Alcohol Use  Drinks/Week  oz/Week  Comments

 

 No      









 Sex Assigned at Birth  Date Recorded

 

 Not on file  









 Job Start Date  Occupation  Industry

 

 Not on file  Not on file  Not on file









 Travel History  Travel Start  Travel End









 No recent travel history available.



documented as of this encounter



Last Filed Vital Signs







 Vital Sign  Reading  Time Taken  Comments

 

 Blood Pressure  132/70  2020 10:14 AM EST  

 

 Pulse  98  2020 10:14 AM EST  

 

 Temperature  -  -  

 

 Respiratory Rate  -  -  

 

 Oxygen Saturation  -  -  

 

 Inhaled Oxygen Concentration  -  -  

 

 Weight  74.8 kg (165 lb)  2020 10:14 AM EST  

 

 Height  170.2 cm (5' 7")  2020 10:14 AM EST  

 

 Body Mass Index  25.84  2020 10:14 AM EST  



documented in this encounter



Patient Instructions

Patient InstructionsJoey Ambriz MD - 2020 10:00 AM ESTBlood in 
urine follow up Dr Kate for blood in urine

Blood test today continue current medications

Get colonoscopy for cancer screen

Continue to cut down on cigarette

Next goal 5 or less per day can you get to one per day?

Please increase your daily exercise The goal with exercise is to  walk 30-40 
minutes at a  3-4 mph pace on 5-7 days per week.

Medrol dose mariana for the fluid behind the ears



Electronically signed by Joey Ambriz MD at 2020 11:02 AM EST

documented in this encounter



Progress Notes

Joey Ambriz MD - 2020 10:00 AM ESTFormatting of this note might be 
different from the original.

PATIENT:  Anupam Rosenberg

MRN:  [id number]

:  1965

DATE OF SERVICE:  2020



CHIEF COMPLAINT:

Chief Complaint

Patient presents with

 Hematuria

  Patient wants his urine tested for blood and cancer. He states he will not go 
back to urology because it costs to much money.

 Earache

  Left ear pain.



Subjective

HISTORY OF PRESENT ILLNESS:

Anupam Rosenberg is a 54-y.o. male.

Hematuria



Earache



head cold 3-4 weeks ago followed by left ear pressure and muffled sound no 
drainage no fevers no pulmonary symptoms some mild sinus congestion no colored 
drainage no right ear symptoms



He has seen urology [facility] in the past for urethral stricture and hematuria 
he had cystoscopy in the past no bladder cancer he has not seen urology recently



He still smokes 5-7 cigarette per day he tried buproprion for one week and quit 
for 3-4 days then quit buproprion and restarted smoking he is interested in 
quit smoking and wants to try to cut down further



No cardiovascular or pulmonary symptoms





Patient Active Problem List

Diagnosis

 Coronary artery disease

 Chronic neck pain

 Cervical spondylosis without myelopathy

 Paroxysmal ventricular tachycardia (HCC)

 Tobacco use disorder

 Mixed hyperlipidemia

 Malignant melanoma in situ (HCC)

 Essential hypertension

 History of melanoma in situ

 History of urethral stricture



No family history on file.

Current Outpatient Medications

Medication Sig

 amLodipine (NORVASC) 5 MG Oral Tab Take 1 Tab by mouth DAILY.

 Aspirin 81 MG Oral Tab Take 1 Tab by mouth DAILY.

 methylPREDNISolone (MEDROL) 4 MG Oral Tab Take 1 Tab by mouth AS 
DIRECTED. Take 8 tablets daily. Decrease by 1 tablet every 3 days till finished.

 OxyCODONE HCl ER (OXYCONTIN) 15 MG Oral Tablet Extended Release 12 hour 
Abuse-Deterrent Take 1 Tab by mouth EVERY EIGHT HOURS AS NEEDED.

 Rosuvastatin Calcium (CRESTOR) 20 MG Oral Tab Take 1 Tab by mouth DAILY.

 Sildenafil Citrate (VIAGRA) 100 MG Oral Tab Take 0.5-1 Tabs by mouth 
DAILY AS NEEDED (1 hour prior to intercourse).



No current facility-administered medications for this visit.



Allergies

Allergen Reactions

 Bactrim Hives

 Doxycycline Rash

 Pcn [Penicillins] Hives

  "all cillins"

 Sulfa Antibiotics Rash



Social History



Socioeconomic History

 Marital status: Single

  Spouse name: Not on file

 Number of children: Not on file

 Years of education: Not on file

 Highest education level: Not on file

Occupational History

 Not on file

Social Needs

 Financial resource strain: Not on file

 Food insecurity

  Worry: Not on file

  Inability: Not on file

 Transportation needs

  Medical: Not on file

  Non-medical: Not on file

Tobacco Use

 Smoking status: Current Every Day Smoker

 Smokeless tobacco: Never Used

Substance and Sexual Activity

 Alcohol use: No

 Drug use: No

 Sexual activity: Yes

  Partners: Male

Lifestyle

 Physical activity

  Days per week: Not on file

  Minutes per session: Not on file

 Stress: Not on file

Relationships

 Social connections

  Talks on phone: Not on file

  Gets together: Not on file

  Attends [employer] service: Not on file

  Active member of club or organization: Not on file

  Attends meetings of clubs or organizations: Not on file

  Relationship status: Not on file

 Intimate partner violence

  Fear of current or ex partner: Not on file

  Emotionally abused: Not on file

  Physically abused: Not on file

  Forced sexual activity: Not on file

Other Topics Concern

 Back Care Not Asked

 Bike Helmet Not Asked

 Blood Transfusions Not Asked

 Caffeine Concern Not Asked

 Exercise No

 Hobby Hazards Not Asked

 International Travel Not Asked

  Service Not Asked

 Occupational Exposure Not Asked

 Seat Belt Not Asked

 Self-Exams Not Asked

 Sleep Concern Not Asked

 Special Diet Yes

  Comment: low fat

 Stress Concern Not Asked

 Weight Concern Not Asked

Social History Narrative

 Lives in [place]

 Unemployed, construction work in past.











REVIEW OF SYSTEMS:

ROS

Objective

PHYSICAL EXAM:

VITALS:  /70  | Pulse 98  | Ht 5' 7" (1.702 m)  | Wt 165 lb (74.8 kg)  | 
BMI 25.84 kg/m  Body mass index is 25.84 kg/m.

Physical Exam

  S1 and S2 normal, no murmurs, clicks, gallops or rubs. Regular rate and 
rhythm. Chest is clear; nowheezes or rales. No edema or JVD. Ears - right ear 
normal, left TYMPANIC MEMBRANE is not red, but is dull and  bulging, hearing 
grossly normal bilaterally.

The abdomen is soft without tenderness, guarding, mass, rebound or 
organomegaly. Bowel sounds are normal. No CVA tenderness or inguinal adenopathy 
noted.

Urine dip no nitrities wbcs positive rbcs

ASSESSMENT / IMPRESSION:

  ICD-9-CM ICD-10-CM

1. Coronary artery disease involving native coronary artery of native heart 
without angina pectoris stable continue current medications  414.01 I25.10 
LIPID PROFILE

   COMPREHENSIVE METABOLIC PANEL

   CREATINE KINASE

2. Tobacco use disorder I spent 12 minutes in direct face to face counseling 
with the patient regarding smoking cessation. A plan was developed with the 
patient to continue to  cut down cigarette use over the next  4 weeks. A long 
term goal to set a quit date was discussed with patient. Pharmacologic and 
behavioral strategies for tobacco cessation were dicussed at length. (60491).  
305.1 F17.200

3. Pre-diabetes cmp today 790.29 R73.03

4. Asymptomatic microscopic hematuria 599.72 R31.21 PSA, TOTAL (FOLLOW-UP 
DIAGNOSTIC)

5. Essential hypertension at goal continue current medications  401.9 I10

6. Routine general medical examination at a health care facility he agrees to 
colonoscopy screen  V70.0 Z00.00 REFER TO GI

7. History of urethral stricture follow up urology  V13.09 Z87.448

8. Hematuria, unspecified type microscopic follow up urology  599.70 R31.9 
URINE DIP MANUAL (AMB POCT)

 9. Left ear serous otitis media medrol dosepak

Patient Instructions

Blood in urine follow up Dr Kate for blood in urine

Blood test today continue current medications

Get colonoscopy for cancer screen

Continue to cut down on cigarette

Next goal 5 or less per day can you get to one per day?

Please increase your daily exercise The goal with exercise is to  walk 30-40 
minutes at a  3-4 mph pace on 5-7 days per week.

Medrol dose mariana for the fluid behind the ears



  Joey Ambriz MD 2020 14:06Electronically signed by Joey Ambriz MD at 2020  2:31 PM ESTdocumented in this encounter



Plan of Treatment







 Date  Type  Specialty  Care Team  Description

 

 2020  GI Procedure  Gastroenterology  Radha Castle MD



  



       [address] [place] [address]



  



       [phone] (Fax)  









 Name  Type  Priority  Associated Diagnoses  Date/Time

 

 LIPID PROFILE  Lab  Routine  Coronary artery disease  2020 11:14 AM



       involving native  EST



       coronary artery of  



       native heart without  



       angina pectoris  

 

 COMPREHENSIVE METABOLIC  Lab  Routine  Coronary artery disease  2020 11:
14 AM



 PANEL      involving native  EST



       coronary artery of  



       native heart without  



       angina pectoris  

 

 CREATINE KINASE  Lab  Routine  Coronary artery disease  2020 11:14 AM



       involving native  EST



       coronary artery of  



       native heart without  



       angina pectoris  

 

 PSA, TOTAL (FOLLOW-UP  Lab  Routine  Asymptomatic microscopic  2020 11:
14 AM



 DIAGNOSTIC)      hematuria  EST









 Name  Type  Priority  Associated Diagnoses  Order Schedule

 

 REFER TO GI  Referral  Routine  Routine general medical  Expected: 2020,



       examination at a [facility] care  Expires: 2021



       facility  









 Health Maintenance  Due Date  Last Done  Comments

 

 PNEUMOCOCCAL 0-64 YRS (1 of  1969    



 1 - PPSV23)      

 

 DTaP/Tdap/Td Vaccines ( -  1974    



 Tdap)      

 

 DEPRESSION SCREENING  1975    

 

 Colonoscopy  2013    

 

 ZOSTER IMMUNIZATION SERIES  2013    



 (1 of 2)      

 

 DIABETES SCREENING  2019, 2017,  



     04/15/2014, Additional  



     history exists  

 

 INFLUENZA VACCINE (#1)  2019    

 

 LIPID DISORDER SCREENING  2020, 2018,  



     2017, Additional  



     history exists  

 

 HEPATITIS A IMMUNIZATION  Aged Out    No longer eligible



 SERIES      based on patient's age



       to complete this topic

 

 HPV IMMUNIZATION SERIES  Aged Out    No longer eligible



       based on patient's age



       to complete this topic

 

 MENINGOCOCCAL VACCINE IMM  Aged Out    No longer eligible



       based on patient's age



       to complete this topic



documented as of this encounter



Goals







 Goal  Patient Goal  Associated  Recent  Patient-Stated?  Author



   Type  Problems  Progress    

 

 Blood Pressure  Blood Pressure    132/70  No  [place],



 < 140/90      (2020    Joey HAWKINS,



       10:14 AM EST)    MD









 Note: 



This is an individualized treatment (blood pressure) goal for Anupam PURVIS Head:



 Displayed above (on the left) is your goal for blood pressure control.  Your 
most recent blood pressure is also shown above, on the right.



 You should try to achieve blood pressures that are lower than your goal listed 
above (on the left).









 Take all prescribed medications as  Self-management      Joey Deluna MD



 directed          









 Note: 



This is an individualized self-management goal for Anupam PURVIS Head:



 Please take all prescribed medications as directed.



 1.  Do not skip doses.  If you cannot afford your medications, talk with your 
doctor.



 2.  Use a pill reminder system such as a pill box if needed.  Your pharmacist 
can help you with this.



 3.  Contact your Pharmacy 5 days before your medication runs out.  If you 
cannot take your medications for any reasons, talk with your doctor.



 4.  Please bring all of your medication bottles and inhalers (or a list of all 
your medications/inhalers) with you to every visit.



 Potential barriers to meeting all of your care plan goals will continue to be 
addressed on an ongoing basis.



documented as of this encounter



Procedures







 Procedure Name  Priority  Date/Time  Associated Diagnosis  Comments

 

 URINE DIP MANUAL  Routine  2020 10:10 AM  Hematuria,  Results for this



 (AMB POCT)    EST  unspecified type  procedure are in



         the results



         section.



documented in this encounter



Results

URINE DIP MANUAL (AMB POCT) (2020 10:10 AM EST)





 Component  Value  Ref Range  Performed At  Pathologist



         Signature

 

 URINE GLUCOSE (POCT)  Negative  Negative mg/dl  [facility]  



       POCT  

 

 URINE BILIRUBIN  Negative  Negative  [facility]  



 (POCT)      POCT  

 

 Urine Ketones (POCT)  Negative  Negative  [facility]  



       POCT  

 

 URINE SPECIFIC  1.010  1.005 - 1.030  [facility]  



 GRAVITY (POCT)      POCT  

 

 URINE BLOOD (POCT)  Small (A)  Negative  [facility]  



       POCT  

 

 URINE PH (POCT)  6.0  5.0 - 8.0  [facility]  



       POCT  

 

 URINE PROTEIN (POCT)  Negative  Negative mg/dl  [facility]  



       POCT  

 

 URINE UROBILINOGEN  0.2  0.2 - 1.0 mg/dl  [facility]  



 (POCT)      POCT  

 

 URINE NITRITES (POCT)  Negative  Negative  [facility]  



       POCT  

 

 URINE LEUKOCYTES  Negative  Negative Cells/uL  [facility]  



 (POCT)      POCT  









 Specimen

 

 Urine - Urine specimen (specimen)









 Performing Organization  Address  City/State/Zipcode  Phone Number

 

 [facility] [address]  [address] [place] [address]  



documented in this encounter



Visit Diagnoses







 Diagnosis

 

 Tobacco use disorder

 

 Coronary artery disease involving native coronary artery of native heart 
without



 angina pectoris

 

 Pre-diabetes







 Other abnormal glucose

 

 Asymptomatic microscopic hematuria

 

 Essential hypertension







 Unspecified essential hypertension

 

 Routine general medical examination at a health care facility

 

 History of urethral stricture







 Personal history of other disorder of urinary system

 

 Hematuria, unspecified type

 

 Acute serous otitis media of left ear without rupture



documented in this encounter



Insurance







 Payer  Benefit Plan /  Subscriber ID  Effective Dates  Phone  Address  Type



   Group          

 

 [facility]  xxxxxxxxx  2017-Prese      Medicare



 TODAYS OPTIONS  TODAYS OPTIONS    nt      Advantage









 Guarantor Name  Account Type  Relation to  Date of Birth  Phone  Billing



     Patient      Address

 

 Anupam Rosenberg  Personal/Family    1965  [phone] [address]



         (Home)



  [facility]



         [phone]  [address]







         ([place])  [place] [address]



documented as of this encounter

## 2020-02-17 NOTE — XMS REPORT
Continuity of Care Document (CCD)

 Created on:2020



Patient:Anupam Rosenberg

Sex:Male

:1965

External Reference #:[id number]





Demographics







 Address  [address] [place] [id number]

 

 Home Phone  [phone]

 

 Preferred Language  en

 

 Marital Status  Not  or 

 

 Islam Affiliation  Unknown

 

 Race  White

 

 Ethnic Group  Not  or 









Author







 Name  Dominguez France DO, MPH

 

 Address  [address] [place] [address], [address]



   Unavailable



   [place] [address]









Care Team Providers







 Name  Role  Phone

 

 Alejandro Bellamy MD - Neurological  Care Team Information   [phone]
[phone]



 Surgery    









Problems







 Description

 

 No Information Available







Social History







 Type  Date  Description  Comments

 

 Birth Sex    Unknown  

 

 Cigarette Use    Current Cigarette Smoker  1 Pack Daily  

 

 ETOH Use    Rarely consumes beer  

 

 Tobacco Use  Start: Unknown  Patient is a current smoker, smokes every  



     day  

 

 Smoking Status  Reviewed: 20  Patient is a current smoker, smokes every  



     day  







Allergies, Adverse Reactions, Alerts







 Active Allergies  Reaction  Severity  Comments  Date

 

 PCN        2005

 

 Amoxicillin        2005

 

 Sulfa Antibiotics  hives      2013







Medications







 Active Medications  SIG  Qnty  Indications  Ordering  Date



         Provider  

 

 Lidoderm  si applied to  30units    Dominguez France,  08/15/2016



          5% Patches  affected area      DO, MPH  



   daily, not more        



   than 10h as        



   directed chronic        



   pain patient        

 

 Oxycodone HCL  si by mouth  120tabs    Dominguez France,  2016



               15mg  every 6 to 8 hours      DO, MPH  



 Tablets  as directed chronic        



   pain patient        

 

 Oxycodone HCL  si/4-1/2  30ml    Dominguez France,  2006



   milliliters sl by      DO, MPH  



 100mg/5ML  mouth every 6 to 8        



 Concentrate  hours as directed        



   chronic pain        



   patient        

 

 Pravastatin Sodium  1 PO QHS      Unknown  



           



 40mg Tablets          



           

 

 Atenolol        Unknown  



          25mg          



 Tablets          



           

 

 Aspir-81  1 by mouth every      Unknown  



          81mg  night at bedtime        



 Tablets DR          



           

 

 Amlodipine Besylate        Unknown  



           



 5mg Tablets          



           

 

 Multi For Him        Unknown  



           



 Capsules          



           









 History Medications









 Prednisone  si/2 to 1 by  30tabs    Dominguez France,  12/10/2019 -



          20mg Tablets  mouth three times a      DO, MPH  2020



   day as directed        







Immunizations







 Description

 

 No Information Available







Vital Signs







 Date  Vital  Result  Comment

 

 2020 11:31am  BP Systolic  142 mmHg  









 BP Diastolic  86 mmHg  

 

 Heart Rate  84 /min  

 

 Respiratory Rate  20 /min  

 

 Height  67 inches  5'7"

 

 Weight  161.00 lb  

 

 Pain Level  8  Pain at this time.

 

 Pain Level With Medicine  7  on average with meds

 

 Pain Level Without Medicine  9  without meds

 

 BMI (Body Mass Index)  25.2 kg/m2  









 12/10/2019 11:32am  BP Systolic  140 mmHg  









 BP Diastolic  86 mmHg  

 

 Heart Rate  84 /min  

 

 Respiratory Rate  20 /min  

 

 Height  67 inches  5'7"

 

 Weight  160.00 lb  

 

 Pain Level  7  Pain at this time.

 

 Pain Level With Medicine  6  on average with meds

 

 Pain Level Without Medicine  9  without meds

 

 BMI (Body Mass Index)  25.1 kg/m2  







Results







 Description

 

 No Information Available







Procedures







 Description

 

 No Information Available







Medical Devices







 Description

 

 No Information Available







Encounters







 Type  Date  Location  Provider  Dx  Diagnosis

 

 Office Visit  12/10/2019  Main Office as  Dominguez France  G89.28  Other chronic



   11:30a  Of 14  DO, MPH    postprocedural pain









 M54.2  Cervicalgia

 

 Z79.891  Long term (current) use of opiate analgesic

 

 Z79.891  Long term (current) use of opiate analgesic









 Office Visit  2019 11:30a  Main Office  Dominguez France  G89.28  Other 
chronic



     as Of 14  DO, MPH    postprocedural pain









 M54.2  Cervicalgia

 

 Z79.891  Long term (current) use of opiate analgesic

 

 Z79.891  Long term (current) use of opiate analgesic









 Office Visit  2019 11:30a  Main Office  Dominguez France  G89.28  Other 
chronic



     as Of 14  DO, MPH    postprocedural pain









 M54.2  Cervicalgia

 

 Z79.891  Long term (current) use of opiate analgesic

 

 Z79.891  Long term (current) use of opiate analgesic









 Office Visit  2019 11:30a  Main Office  Dominguez France  G89.28  Other 
chronic



     as Of 14  DO, MPH    postprocedural pain









 M54.2  Cervicalgia

 

 Z79.891  Long term (current) use of opiate analgesic

 

 Z79.891  Long term (current) use of opiate analgesic









 Office Visit  2019 11:30a  Main Office  France, Dominguez,  G89.28  Other 
chronic



     as Of 14  , MPH    postprocedural pain









 M54.2  Cervicalgia

 

 Z79.891  Long term (current) use of opiate analgesic

 

 Z79.891  Long term (current) use of opiate analgesic







Assessments







 Date  Code  Description  Provider

 

 2020  G89.28  Other chronic postprocedural pain  France, Dominguez, DO, MPH

 

 2020  M54.2  Cervicalgia  France, Dominguez, DO, MPH

 

 2020  Z79.891  Long term (current) use of opiate analgesic  France, Dominguez
, DO, MPH

 

 2020  Z79.891  Long term (current) use of opiate analgesic  France, Dominguez
, DO, MPH

 

 12/10/2019  G89.28  Other chronic postprocedural pain  France, Dominguez, DO, MPH

 

 12/10/2019  M54.2  Cervicalgia  France, Dominguez, DO, MPH

 

 12/10/2019  Z79.891  Long term (current) use of opiate analgesic  France, Dominguez
, DO, MPH

 

 12/10/2019  Z79.891  Long term (current) use of opiate analgesic  France, Dominguez
, DO, MPH

 

 2019  G89.28  Other chronic postprocedural pain  France, Dominguez, DO, MPH

 

 2019  M54.2  Cervicalgia  France, Dominguez, DO, MPH

 

 2019  Z79.891  Long term (current) use of opiate analgesic  France, Dominguez
, DO, MPH

 

 2019  Z79.891  Long term (current) use of opiate analgesic  France, Dominguez
, DO, MPH

 

 2019  G89.28  Other chronic postprocedural pain  France, Dominguez, DO, MPH

 

 2019  M54.2  Cervicalgia  France, Dominguez, DO, MPH

 

 2019  Z79.891  Long term (current) use of opiate analgesic  France, Dominguez
, DO, MPH

 

 2019  Z79.891  Long term (current) use of opiate analgesic  Dominguez France DO MPH

 

 2019  G89.28  Other chronic postprocedural pain  Dominguez France DO MPH

 

 2019  M54.2  Cervicalgia  Dominguez France DO, MPH

 

 2019  Z79.891  Long term (current) use of opiate analgesic  Dominguez France DO, MPH

 

 2019  Z79.891  Long term (current) use of opiate analgesic  Dominguez France DO, MPH

 

 2019  G89.28  Other chronic postprocedural pain  Dominguez France DO, MPH

 

 2019  M54.2  Cervicalgia  Dominguez France DO MPH

 

 2019  Z79.891  Long term (current) use of opiate analgesic  Dominguez France DO, MPH

 

 2019  Z79.891  Long term (current) use of opiate analgesic  Dominguez France DO, MPH







Plan of Treatment

Future Appointment(s):2020 11:30 am - Dominguez France DO MPH at Main 
Office as Of  - Dominguez France DO, MPHG89.28 Other chronic 
postprocedural painComments:Chronic. Symptoms and complaints discussed and 
reviewed today. No significant changes in physical findings.  Continue current 
medical pain management.M54.2 CervicalgiaComments:Chronic. Symptoms and 
complaints discussed and reviewed today. No significant changes in physical 
findings.  Continue current medical pain management.Z79.891 Long term (current) 
use of opiate analgesicNew Labs:Urine Drug Screen, Ordered: 20Comments:
Urine drug screen sample taken.  Rapid Point of Care Cup was reviewed in office 
with patient.  Will send out UDT Rapid to Quantitative lab for confirmation 
testing.  Urine Drug Testing (UDT) was done today to monitor opiate use and to 
monitor possible use of illicit substances. I will discuss the results at the 
next appointment from the Quantitative lab.The following tests were ordered:6 AM
, AMPH, ZELDA, GREGOR, BUP, CARIS, COCM, ETG, FENT, MCSHSG,  OPI, OXY, PCP, TAPEN,
  XTSY, ZOLP.      A urine drug test (UDT) using a rapid screen cup was ordered 
for this patient and collected on site today.  Creatinine has been ordered as 
well for specimen validity, not for kidney function.  Urine Drug Testing is a 
mandatory component of chronic opioid management, as part of the baseline 
assessment and ongoing re-assessment of opioid therapy. Per [facility] 
Workers' Compensation Board, [place] Non-Acute Pain Medical Treatment 
Guidelines, section F.3.d.i. This test is to be used in conjunction with other 
clinical information when decisions are to be made to continue, adjust or 
discontinue treatment. This information includes clinical observation, results 
of addiction screening, pill counts, and prescription drug monitoring reports.  
Preliminary UDT screen results are not final and should not be used to 
determine patient care or plan of treatment.  This sample will be sent out for 
a more comprehensive quantitative confirmation LCMS study.  It is part of the 
treatment process of prescribing controlled substances and is considered 
standard of care at this clinic.AllComments:Continue current medical pain 
management;  injection therapy, osteopathic manipulation, PT / modalities, and 
consults as needed to manage chronic pain.Non - opioid pain management 
discussed and optionsdiscussed.Side effects discussed; anticipatory guidance 
given. Patient clearly understand and agree with all medical treatments and 
suggestions. All medicines prescribed are adequate and appropriate for this 
patient's complaint of pain, medical history, physical, and personal 
goals.Goals of Treatment are to provide adequate and appropriate 
multidisciplinary medical pain management to increase/ maintain patient's 
quality of life and functionality while maintaining satisfactory side effect 
profile andminimizing long term end-organ damage. Importance of regular 
nutrition throughout the day discussed.Activity as toleratedContinue with PCP



Functional Status







 Description

 

 No Information Available







Mental Status







 Description

 

 No Information Available







Referrals







 Description

 

 No Information Available

## 2020-02-17 NOTE — ED
HPI Chest Pain





- HPI Summary


HPI Summary: 


The patient is a 53 y/o M presenting to [facility] accompanied by wife with cc of 

left anterior CP since this morning but with improvement. He reports that the 

crushing heart pain rated 10/10 in severity woke him up this morning, but the 

pain has since improved to dull/aching pain described as something sitting on 

the chest rated 3/10 in severity. The pain did not radiate anywhere. He 

endorses a low-grade fever but denies any diaphoresis, nausea, SOB, or cough. 

There were no aggravating or alleviating factors, and he did not take any 

medications for treatment PTA. Cardiac hx significant for MI in 2007 with stent 

placement, cardiac cath in 2013 mild to moderate in-stent re-stenosis of 45-50% 

of the LAD, and stress test in 2016 with normal results. He follows with Dr. Farias and has not seen her recently but has an appointment in August 2020. 

The current episode is not severe compared to his MI. PMHx: angina, DVT, CAD, 

HLD, HTN, GERD, arthritis. Current heavy smoker, rare EtOH, no substance use. 

Medications reviewed. Allergies noted.





- History of Current Complaint


Chief Complaint: EDChestPainROMI


Time Seen by Provider: 02/17/20 20:18


Hx Obtained From: Patient


Onset/Duration: Started Hours Ago, Still Present


Timing: Constant


Initial Severity: Severe


Current Severity: Mild


Pain Intensity: 3


Pain Scale Used: 0-10 Numeric


Chest Pain Location: Left Anterior


Chest Pain Radiates: No


Character: Dull/Aching - current, Sharp/Stabbing - initially


Aggravating Factor(s): Nothing


Alleviating Factor(s): Nothing


Associated Signs and Symptoms: Positive: Chest Pain.  Negative: Shortness of 

Breath, Diaphoresis, Nausea, Cough





- Additional Pertinent History


Primary Care Physician: [id number]





- Allergy/Home Medications


Allergies/Adverse Reactions: 


 Allergies











Allergy/AdvReac Type Severity Reaction Status Date / Time


 


Penicillins Allergy  Hives Verified 02/17/20 20:24


 


Sulfa (Sulfonamide Allergy  Hives Verified 02/17/20 20:24





Antibiotics)     











Home Medications: 


 Home Medications





methylPREDNISolone TAB* [Medrol TAB*] 1 tab PO DAILY 02/17/20 [History 

Confirmed 02/17/20]











PMH/Surg Hx/FS Hx/Imm Hx


Endocrine/Hematology History: 


   Denies: Hx Diabetes


Cardiovascular History: Reports: Hx Angina, Hx Coronary Artery Disease - STENT, 

Hx Deep Vein Thrombosis - LEG 2006, Hx Hypercholesterolemia, Hx Hypertension, 

Hx Myocardial Infarction, Other Cardiovascular Problems/Disorders - BLOOD CLOT 

IN RIGHT LOWER LEG AFTER KNEE SURGERY


   Denies: Hx Congestive Heart Failure, Hx Pacemaker/ICD


GI History: Reports: Hx Gastroesophageal Reflux Disease - OCCASSIOANL -TAKES 2 

TUMS WITH RELIEF, Other GI Disorders - HEMMORHOIDS


 History: Reports: Hx Kidney Infection - hx in past


   Denies: Hx Renal Disease


Musculoskeletal History: Reports: Hx Arthritis, Other Musculoskeletal History - 

CHRONIC CERVICAL DISK DISEASE


Sensory History: 


   Denies: Hx Contacts or Glasses, Hx Hearing Aid


Opthamlomology History: 


   Denies: Hx Contacts or Glasses


Neurological History: Reports: Hx Headaches - r/t neck


Psychiatric History: 


   Denies: Hx Panic Disorder





- Cancer History


Cancer Type, Location and Year: MELANOMA - REMOVED FROM FACE


Hx Chemotherapy: No


Hx Radiation Therapy: No





- Surgical History


Surgical History: Yes


Surgery Procedure, Year, and Place: CARDIAC STENTS 2007 "MULTILINK VISION", 

CERVICAL LAMINECTOMY 2004 AND 2014, HEMMORRHOIDS, KNEE 2006


Hx Anesthesia Reactions: No


Infectious Disease History: No


Infectious Disease History: 


   Denies: Traveled Outside the [place] in Last 30 Days





- Family History


Known Family History: Positive: Cardiac Disease - Mother MI < 56 y/o, 

Hypertension





- Social History


Alcohol Use: Rare


Hx Substance Use: No


Substance Use Type: Reports: None


Hx Tobacco Use: Yes


Smoking Status (MU): Heavy Every Day Tobacco Smoker


Type: Cigarettes


Amount Used/How Often: PT STATES 1PPD, SO STATES 2PPD


Have You Smoked in the Last Year: Yes





Review of Systems


Positive: Fever - low-grade.  Negative: Skin Diaphoresis


Positive: Chest Pain - left anterior


Negative: Shortness Of Breath, Cough


Negative: Nausea


All Other Systems Reviewed And Are Negative: Yes





Physical Exam





- Summary


Physical Exam Summary: 


Constitutional: Well-developed, Well-nourished, Alert. (-) Distressed


Skin: Warm, Dry


HENT: Normocephalic; Atraumatic


Eyes: Conjunctiva normal


Neck: Musculoskeletal ROM normal neck. (-) JVD, (-) Stridor, (-) Nuchal rigidity


Cardio: Rhythm regular, rate normal, Heart sounds normal; Intact distal pulses; 

Radial pulses are 2+ and symmetric. (-) Murmur


Pulmonary/Chest wall: Effort normal. (-) Respiratory distress, (-) Wheezes, (-) 

Rales


Abd: Soft, (-) tenderness, (-) Distension, (-) Guarding, (-) Rebound


Musculoskeletal: (-) Edema


Lymph: (-) Cervical adenopathy


Neuro: Alert, Oriented x3


Psych: Mood and affect Normal


Triage Information Reviewed: Yes


Vital Signs On Initial Exam: 


 Initial Vitals











Temp Pulse Resp BP Pulse Ox


 


 99.2 F   71   15   112/79   96 


 


 02/17/20 19:52  02/17/20 19:52  02/17/20 19:52  02/17/20 19:52  02/17/20 19:52











Vital Signs Reviewed: Yes





Procedures





- Sedation


Patient Received Moderate/Deep Sedation with Procedure: No





Diagnostics





- Vital Signs


 Vital Signs











  Temp Pulse Resp BP Pulse Ox


 


 02/17/20 19:52  99.2 F  71  15  112/79  96














- Laboratory


Lab Results: 


 Lab Results











  02/17/20 02/17/20 Range/Units





  20:03 20:03 


 


WBC  11.7 H   (3.5-10.8)  10^3/uL


 


RBC  5.37   (4.18-5.48)  10^6 /uL


 


Hgb  17.3   (14.0-18.0)  g/dL


 


Hct  50   (42-52)  %


 


MCV  94   (80-94)  fL


 


MCH  32 H   (27-31)  pg


 


MCHC  34   (31-36)  g/dL


 


RDW  14   (10-15)  %


 


Plt Count  254   (150-450)  10^3/uL


 


MPV  8.2   (7.4-10.4)  fL


 


Neut % (Auto)  81.7   %


 


Lymph % (Auto)  12.7   %


 


Mono % (Auto)  4.3   %


 


Eos % (Auto)  1.0   %


 


Baso % (Auto)  0.3   %


 


Absolute Neuts (auto)  9.5 H   (1.5-7.7)  10^3/ul


 


Absolute Lymphs (auto)  1.5   (1.0-4.8)  10^3/ul


 


Absolute Monos (auto)  0.5   (0-0.8)  10^3/ul


 


Absolute Eos (auto)  0.1   (0-0.6)  10^3/ul


 


Absolute Basos (auto)  0.0   (0-0.2)  10^3/ul


 


Absolute Nucleated RBC  0.0   10^3/ul


 


Nucleated RBC %  0.0   


 


INR (Anticoag Therapy)   0.99  (0.82-1.09)  











Result Diagrams: 


 02/17/20 20:03





 02/17/20 20:03


Lab Statement: Any lab studies that have been ordered have been reviewed, and 

results considered in the medical decision making process.





- Radiology


  ** CXR


Radiology Interpretation Completed By: ED Physician


Summary of Radiographic Findings: No acute abnormality. ED physician has 

reviewed and interpreted this report. Pending official read.





- EKG


  ** 1949


Cardiac Rate: NL - 70 BPM


EKG Rhythm: Sinus Rhythm


Summary of EKG Findings: An EKG at 1949 reveals rate of 70 BPM, nml axis, nml 

intervals. No STEMI. No acute changes. ED physician has reviewed and 

interpreted this EKG.





Re-Evaluation





- Re-Evaluation


  ** First Eval


Re-Evaluation Time: 21:00


Comment: Discussed results and concern of need for admission. Patient declines 

admission and would like AMA. Discussed all risks and benefits of leaving AMA, 

patient aware and would still like to leave. Plan to f/u with Dr. Farias.





Chest Pain Course/Dx





- Course


Course Of Treatment: 53 y/o M w hx MI, CAD, tobacco use and HTN p/w episode of 

CP that began this AM.  - patient reports anterior 10/10 CP that is resolving. 

EKG here unchanged, troponin negative. CXR w/o acute abnormality.  - ddx 

includes: ACS, atypical CP. Also consider: PNA - no e/o infiltrate on exam, no 

fever or leukocytosis. No PTX on Xray, no mediastinal widening or tearing CP to 

suggest dissection.  Does have a hx of provoked DVT many years ago after 

surgery (no PE), no SOB, hypoxia or tachycardia on VS.  Heart score 4: moderate 

risk, d/w patient observation, he does not wish to stay and wants to leave AMA.

  Patient is AAOx4 with clear sensorium, no signs of intoxication, no SI/HI, a 

normal gait and normal speech pattern and capacity to refuse care.  I explained 

to the patient the risks of leaving AMA to include death, disability, and loss 

of function.  I had an extensive conversation with the patient regarding return 

precautions and encouraged them to return sooner for any worsening condition, 

new symptoms or ANY other concerns.





- Diagnoses


Provider Diagnoses: 


 Chest pain








Discharge ED





- Sign-Out/Discharge


Documenting (check all that apply): Patient Departure - Patient is leaving AMA.





- Discharge Plan


Condition: Stable


Disposition: AGAINST MEDICAL ADVICE


Patient Education Materials:  Chest Pain (ED)


Referrals: 


Joey Ambriz MD [Primary Care Provider] - 


Selina Farias MD [Medical Doctor] - 3 Days


Additional Instructions: 


You were seen in the emergency department for chest pain.  Your EKG (heart 

tracing), labs and chest x-ray did not show any cause for pain.  Please call 

your cardiologist tomorrow. Important that you follow up with you primary care 

doctor in the next 1-2 days to help schedule an outpatient stress test.  Please 

return to the emergency department for continued chest pain, trouble breathing, 

passing out, or if you're concerned. 





- Billing Disposition and Condition


Condition: STABLE


Disposition: Against Medical Advice





- Attestation Statements


Document Initiated by Lary: Yes


Documenting Scribe: Edilma Holley


Provider For Whom Lary is Documenting (Include Credential): Dr. Jennifer Gomez MD


Scribe Attestation: 


Edilma CLARKE, scribed for Dr. Jennifer Gomez MD on 02/17/20 at 2152. 


Scribe Documentation Reviewed: Yes


Provider Attestation: 


The documentation as recorded by the Edilma fraser accurately reflects 

the service I personally performed and the decisions made by me, Dr. Jennifer Gomez MD


Status of Scribe Document: Viewed